# Patient Record
Sex: MALE | Race: WHITE | Employment: FULL TIME | URBAN - METROPOLITAN AREA
[De-identification: names, ages, dates, MRNs, and addresses within clinical notes are randomized per-mention and may not be internally consistent; named-entity substitution may affect disease eponyms.]

---

## 2019-08-26 ENCOUNTER — OFFICE VISIT (OUTPATIENT)
Dept: PULMONOLOGY | Age: 29
End: 2019-08-26
Payer: COMMERCIAL

## 2019-08-26 VITALS
HEIGHT: 72 IN | SYSTOLIC BLOOD PRESSURE: 128 MMHG | DIASTOLIC BLOOD PRESSURE: 75 MMHG | OXYGEN SATURATION: 97 % | BODY MASS INDEX: 29.12 KG/M2 | TEMPERATURE: 98.1 F | HEART RATE: 66 BPM | WEIGHT: 215 LBS | RESPIRATION RATE: 16 BRPM

## 2019-08-26 DIAGNOSIS — R53.83 FATIGUE, UNSPECIFIED TYPE: ICD-10-CM

## 2019-08-26 DIAGNOSIS — G47.10 HYPERSOMNIA: ICD-10-CM

## 2019-08-26 DIAGNOSIS — G47.30 OBSERVED SLEEP APNEA: Primary | ICD-10-CM

## 2019-08-26 DIAGNOSIS — R06.83 SNORING: ICD-10-CM

## 2019-08-26 PROCEDURE — 99204 OFFICE O/P NEW MOD 45 MIN: CPT | Performed by: INTERNAL MEDICINE

## 2019-08-26 RX ORDER — FLUOXETINE HYDROCHLORIDE 20 MG/1
20 CAPSULE ORAL DAILY
COMMUNITY

## 2019-08-26 ASSESSMENT — SLEEP AND FATIGUE QUESTIONNAIRES
HOW LIKELY ARE YOU TO NOD OFF OR FALL ASLEEP WHILE SITTING QUIETLY AFTER LUNCH WITHOUT ALCOHOL: 2
HOW LIKELY ARE YOU TO NOD OFF OR FALL ASLEEP WHILE SITTING AND TALKING TO SOMEONE: 1
HOW LIKELY ARE YOU TO NOD OFF OR FALL ASLEEP WHILE WATCHING TV: 2
HOW LIKELY ARE YOU TO NOD OFF OR FALL ASLEEP WHILE LYING DOWN TO REST IN THE AFTERNOON WHEN CIRCUMSTANCES PERMIT: 3
NECK CIRCUMFERENCE (INCHES): 15.5
HOW LIKELY ARE YOU TO NOD OFF OR FALL ASLEEP WHILE SITTING INACTIVE IN A PUBLIC PLACE: 1
HOW LIKELY ARE YOU TO NOD OFF OR FALL ASLEEP WHILE SITTING AND READING: 2
HOW LIKELY ARE YOU TO NOD OFF OR FALL ASLEEP IN A CAR, WHILE STOPPED FOR A FEW MINUTES IN TRAFFIC: 2
ESS TOTAL SCORE: 16
HOW LIKELY ARE YOU TO NOD OFF OR FALL ASLEEP WHEN YOU ARE A PASSENGER IN A CAR FOR AN HOUR WITHOUT A BREAK: 3

## 2019-08-26 NOTE — PROGRESS NOTES
[facility]                                                                  CHIEF COMPLAINT: Sleep apnea evaluation    Consulting provider: [address] [place] [address]    HPI:   Snoring at night for the past 14 years. The severity of snoring is mild. Fan makes snoring worse. Not sure what makes it better. Has observed sleep apnea. + restorative sleep. + dry mouth upon awakening. Patient is complaining of daytime sleepiness, fatigue and tiredness at times during the day. Bedtime 9 pm and rise time is 5 am. Sleep onset  minutes. 0-1 nocturia. 4-5 naps/week for 1-2 hrs. No car wrecks or near wrecks because of the sleepiness. + nodding off while driving. Drinks 1caffinated beverages per day. PMH:   PTSD  Depression and anxiety on Prozac       Past Surgical History:    History reviewed. No pertinent surgical history. Allergies:  has No Known Allergies. Social History:    TOBACCO:   reports that he has been smoking cigarettes. He has a 18.00 pack-year smoking history. He has quit using smokeless tobacco.  His smokeless tobacco use included snuff. ETOH:   has no alcohol history on file.       Family History:   Father and brother with JORGE ALBERTO       Current Medications:    Current Outpatient Medications:     FLUoxetine (PROZAC) 20 MG capsule, Take 20 mg by mouth daily, Disp: , Rfl:       REVIEW OF SYSTEMS:  Constitutional: + chills   HENT: Negative for sore throat  Eyes: Negative for redness   Respiratory: Negative for dyspnea, cough  Cardiovascular: Negative for chest pain  Gastrointestinal: Negative for vomiting, diarrhea   Genitourinary: Negative for hematuria   Musculoskeletal: + arthralgias   Skin: Negative for rash  Neurological: Negative for syncope  Hematological: Negative for adenopathy  Psychiatric/Behavorial: Negative for anxiety      Objective:   PHYSICAL EXAM:    Blood pressure 128/75, pulse 66, temperature 98.1 °F (36.7 °C), temperature source Oral, resp. rate 16, height 6' (1.829 m), weight 215 lb (97.5 kg), SpO2 97 %.' on RA  Gen: No distress. BMI of 29.16   Eyes: PERRL. No sclera icterus. No conjunctival injection. ENT: No discharge. Pharynx clear. Mallampati class IV. Neck: Trachea midline. No obvious mass. Neck circumference 15.5\"  Resp: No accessory muscle use. No crackles. No wheezes. No rhonchi. No dullness on percussion. Good air entry. CV: Regular rate. Regular rhythm. No murmur or rub. No edema. GI: Non-tender. Non-distended. No hernia. Skin: Warm and dry. No nodule on exposed extremities. Lymph: No cervical LAD. No supraclavicular LAD. M/S: No cyanosis. No joint deformity. No clubbing. Neuro: Awake. Alert. Moves all four extremities. Psych: Oriented x 3. No anxiety. DATA reviewed by me:   None found     Assessment:       · Snoring  · Observed sleep apnea   · Hypersomnia   · Fatigue   · PTSD and depression on Prozac       Plan:       · Obtain old records from the [facility]   · PSG evaluate for sleep related breathing disorder. Treatment options were discussed with patient if PSG reveals JORGE ALBERTO, including CPAP therapy, oral appliances, upper airway surgery and hypoglossal nerve stimulation. · Discussed with patient the pathophysiology of apnea. · Sleep hygiene  · Avoid sedatives, alcohol and caffeinated drinks at bed time. · No driving motorized vehicles or operating heavy machinery while fatigue, drowsy or sleepy. · Weight loss is also recommended as a long-term intervention. · Complications of JORGE ALBERTO if not treated were discussed with patient patient to include systemic hypertension, pulmonary hypertension, cardiovascular morbidities, car accidents and all cause mortality.

## 2019-09-25 ENCOUNTER — HOSPITAL ENCOUNTER (OUTPATIENT)
Dept: SLEEP CENTER | Age: 29
Discharge: HOME | End: 2019-09-27
Payer: OTHER GOVERNMENT

## 2019-09-25 DIAGNOSIS — G47.30 OBSERVED SLEEP APNEA: ICD-10-CM

## 2019-09-25 DIAGNOSIS — G47.10 HYPERSOMNIA: ICD-10-CM

## 2019-09-25 DIAGNOSIS — R53.83 FATIGUE, UNSPECIFIED TYPE: ICD-10-CM

## 2019-09-25 DIAGNOSIS — R06.83 SNORING: ICD-10-CM

## 2019-09-25 PROCEDURE — 95810 POLYSOM 6/> YRS 4/> PARAM: CPT

## 2019-09-26 ENCOUNTER — TELEPHONE (OUTPATIENT)
Dept: PULMONOLOGY | Age: 29
End: 2019-09-26

## 2019-09-26 DIAGNOSIS — G47.33 OSA (OBSTRUCTIVE SLEEP APNEA): Primary | ICD-10-CM

## 2019-10-16 ENCOUNTER — HOSPITAL ENCOUNTER (OUTPATIENT)
Dept: SLEEP CENTER | Age: 29
Discharge: HOME | End: 2019-10-18
Payer: OTHER GOVERNMENT

## 2019-10-16 DIAGNOSIS — G47.33 OSA (OBSTRUCTIVE SLEEP APNEA): ICD-10-CM

## 2019-10-16 PROCEDURE — 95811 POLYSOM 6/>YRS CPAP 4/> PARM: CPT

## 2019-10-17 DIAGNOSIS — G47.33 OSA (OBSTRUCTIVE SLEEP APNEA): Primary | ICD-10-CM
